# Patient Record
Sex: FEMALE | Race: WHITE | Employment: OTHER | URBAN - METROPOLITAN AREA
[De-identification: names, ages, dates, MRNs, and addresses within clinical notes are randomized per-mention and may not be internally consistent; named-entity substitution may affect disease eponyms.]

---

## 2017-03-28 ENCOUNTER — TELEPHONE (OUTPATIENT)
Dept: FAMILY MEDICINE CLINIC | Age: 57
End: 2017-03-28

## 2017-03-28 ENCOUNTER — OFFICE VISIT (OUTPATIENT)
Dept: FAMILY MEDICINE CLINIC | Age: 57
End: 2017-03-28

## 2017-03-28 VITALS
HEART RATE: 66 BPM | OXYGEN SATURATION: 96 % | WEIGHT: 172.8 LBS | SYSTOLIC BLOOD PRESSURE: 117 MMHG | DIASTOLIC BLOOD PRESSURE: 63 MMHG | BODY MASS INDEX: 29.5 KG/M2 | TEMPERATURE: 98 F | HEIGHT: 64 IN | RESPIRATION RATE: 18 BRPM

## 2017-03-28 DIAGNOSIS — H69.82 EUSTACHIAN TUBE DYSFUNCTION, LEFT: Primary | ICD-10-CM

## 2017-03-28 NOTE — PATIENT INSTRUCTIONS
Purchase over the counter Sudafed 12hr, off brand is fine, take 1 pill in the morning and Flonase Nasal Spray, use 2 sprays each side of your nose once daily. Purchase Afrin or Neosynephrine Nasal Spray to take with you on your flight to use if needed (pain, pressure or fullness of your ears). On the day of your flights (both to and from) take your Sudafed 1 hour prior to [address] Use over the counter Tylenol or Advil as needed for pain.

## 2017-03-28 NOTE — TELEPHONE ENCOUNTER
Pt has an earache, would like to be seen tomorrow if possible. She is going [place] seas and doesn't want to have the pain the whole time.      Pt# [phone] ext 4. her work #

## 2017-03-28 NOTE — PROGRESS NOTES
HISTORY OF PRESENT ILLNESS  Alane Collet is a 59 y.o. female here today with pain/fullness of left ear since yesterday. She will be flying to [facility] in about 2 weeks and she is worried about flying with ear pain. No sore throat, fever or lower respiratory sxs. Ear Pain   The history is provided by the patient. This is a new problem. The current episode started yesterday. Pertinent negatives include no chest pain, no abdominal pain and no shortness of breath. Review of Systems   Constitutional: Negative for fever. HENT: Negative for congestion and sore throat. Respiratory: Negative for shortness of breath. Cardiovascular: Negative for chest pain. Gastrointestinal: Negative for abdominal pain. Neurological: Negative for dizziness. Physical Exam   Constitutional: She is oriented to person, place, and time. She appears well-developed and well-nourished. /63 (BP 1 Location: Left arm, BP Patient Position: Sitting)  Pulse 66  Temp 98 °F (36.7 °C) (Oral)   Resp 18  Ht 5' 4\" (1.626 m)  Wt 172 lb 12.8 oz (78.4 kg)  SpO2 96%  BMI 29.66 kg/m2     HENT:   TMs dull but clear bilaterally, no pre or post auricular adenopathy   Cardiovascular: Normal heart sounds. Pulmonary/Chest: Breath sounds normal.   Lymphadenopathy:     She has no cervical adenopathy. Neurological: She is alert and oriented to person, place, and time. Nursing note and vitals reviewed.     Patient Active Problem List   Diagnosis Code    Depression F32.9    History of lumpectomy of right breast Z90.11    History of colonoscopy Z98.890     Past Medical History:   Diagnosis Date    Depression     Psychotic disorder     Thyroid disease      Social History     Social History    Marital status: SINGLE     Spouse name: N/A    Number of children: N/A    Years of education: N/A     Social History Main Topics    Smoking status: Never Smoker    Smokeless tobacco: Never Used    Alcohol use No    Drug use: No    Sexual activity: Not Asked     Other Topics Concern    None     Social History Narrative     Family History   Problem Relation Age of Onset    Cancer Mother     Alzheimer Mother     Parkinson's Disease Mother     Stroke Mother     Breast Cancer Mother 54    Cancer Father     Breast Cancer Cousin 35      age 35     Current Outpatient Prescriptions   Medication Sig    buPROPion SR (WELLBUTRIN SR) 150 mg SR tablet Take 150 mg by mouth two (2) times a day.  perphenazine (TRILAFON) 2 mg tablet Take 4 mg by mouth two (2) times a day.  multivitamin (ONE A DAY) tablet Take 1 Tab by mouth daily. No Known Allergies      ASSESSMENT and PLAN  Treatment instructions printed out for pt to use Sudafed and Flonase prior to flight and take Afrin with her, Tylenol and/or Advil as needed for pain, follow up if not improving. Care plan reviewed and pt understands. After visit summary printed and reviewed with patient. Woodrow Moore was seen today for ear pain.     Diagnoses and all orders for this visit:    Eustachian tube dysfunction, left

## 2017-03-28 NOTE — MR AVS SNAPSHOT
Visit Information Date & Time Provider Department Dept. Phone Encounter #  
 3/28/2017  3:45 PM Catherine Restrepo MD [address] [facility] [phone] [id number] Upcoming Health Maintenance Date Due  
 PAP AKA CERVICAL CYTOLOGY 2/15/1979 FOBT Q 1 YEAR AGE 50-75 2/15/2008 BREAST CANCER SCRN MAMMOGRAM 12/16/2018 DTaP/Tdap/Td series (2 - Td) 12/20/2026 Allergies as of 3/28/2017  Review Complete On: 3/28/2017 By: Carla Benz LPN No Known Allergies Current Immunizations  Reviewed on 12/20/2016 Name Date Influenza Vaccine (Quad) PF 12/2/2016 Tdap 12/20/2016 Not reviewed this visit You Were Diagnosed With   
  
 Codes Comments Eustachian tube dysfunction, left    -  Primary ICD-10-CM: O90.78 ICD-9-CM: 381.81 Vitals BP Pulse Temp Resp Height(growth percentile) Weight(growth percentile)  
 117/63 (BP 1 Location: Left arm, BP Patient Position: Sitting) 66 98 °F (36.7 °C) (Oral) 18 5' 4\" (1.626 m) 172 lb 12.8 oz (78.4 kg) SpO2 BMI OB Status Smoking Status 96% 29.66 kg/m2 Menopause Never Smoker BMI and BSA Data Body Mass Index Body Surface Area  
 29.66 kg/m 2 1.88 m 2 Preferred Pharmacy Pharmacy Name Phone [id number]- [facility] [address] [phone] Your Updated Medication List  
  
   
This list is accurate as of: 3/28/17  4:24 PM.  Always use your most recent med list.  
  
  
  
  
 buPROPion  mg SR tablet Commonly known as:  Moe Rad Take 150 mg by mouth two (2) times a day. multivitamin tablet Commonly known as:  ONE A DAY Take 1 Tab by mouth daily. perphenazine 2 mg tablet Commonly known as:  TRILAFON Take 4 mg by mouth two (2) times a day. Patient Instructions Purchase over the counter Sudafed 12hr, off brand is fine, take 1 pill in the morning and Flonase Nasal Spray, use 2 sprays each side of your nose once daily. Purchase Afrin or Neosynephrine Nasal Spray to take with you on your flight to use if needed (pain, pressure or fullness of your ears). On the day of your flights (both to and from) take your Sudafed 1 hour prior to [address]. Use over the counter Tylenol or Advil as needed for pain. Introducing [facility]! Uriah Redmond introduces [employer] patient portal. Now you can access parts of your medical record, email your doctor's office, and request medication refills online. 1. In your internet browser, go to [email or web address] 2. Click on the First Time User? Click Here link in the Sign In box. You will see the New Member Sign Up page. 3. Enter your [employer] Access Code exactly as it appears below. You will not need to use this code after youve completed the sign-up process. If you do not sign up before the expiration date, you must request a new code. · [employer] Access Code: [email or web address] Expires: 6/26/2017  4:24 PM 
 
4. Enter the last four digits of your Social Security Number (xxxx) and Date of Birth (mm/dd/yyyy) as indicated and click Submit. You will be taken to the next sign-up page. 5. Create a [employer] ID. This will be your [employer] login ID and cannot be changed, so think of one that is secure and easy to remember. 6. Create a [employer] password. You can change your password at any time. 7. Enter your Password Reset Question and Answer. This can be used at a later time if you forget your password. 8. Enter your e-mail address. You will receive e-mail notification when new information is available in [address] 9. Click Sign Up. You can now view and download portions of your medical record. 10. Click the Download Summary menu link to download a portable copy of your medical information.  
 
If you have questions, please visit the Frequently Asked Questions section of the [employer]. Remember, "[employer]"hart is NOT to be used for urgent needs. For medical emergencies, dial 911. Now available from your iPhone and Android! Please provide this summary of care documentation to your next provider. Your primary care clinician is listed as Avery Kaur. If you have any questions after today's visit, please call [phone].

## 2017-03-28 NOTE — PROGRESS NOTES
Chief Complaint   Patient presents with    Ear Pain     Left ear hurting. Wants both ears checked for wax.

## 2018-02-28 ENCOUNTER — HOSPITAL ENCOUNTER (OUTPATIENT)
Dept: MAMMOGRAPHY | Age: 58
Discharge: HOME | End: 2018-02-28
Payer: COMMERCIAL

## 2018-02-28 DIAGNOSIS — R92.8 ABNORMAL MAMMOGRAM OF RIGHT BREAST: ICD-10-CM

## 2018-02-28 DIAGNOSIS — Z12.39 SCREENING BREAST EXAMINATION: ICD-10-CM

## 2018-02-28 PROCEDURE — 76642 ULTRASOUND BREAST LIMITED: CPT

## 2018-02-28 PROCEDURE — 77065 DX MAMMO INCL CAD UNI: CPT

## 2018-02-28 PROCEDURE — 77067 SCR MAMMO BI INCL CAD: CPT

## 2019-03-05 ENCOUNTER — HOSPITAL ENCOUNTER (OUTPATIENT)
Dept: MAMMOGRAPHY | Age: 59
Discharge: HOME | End: 2019-03-05
Payer: COMMERCIAL

## 2019-03-05 DIAGNOSIS — Z12.31 VISIT FOR SCREENING MAMMOGRAM: ICD-10-CM

## 2019-03-05 PROCEDURE — 77067 SCR MAMMO BI INCL CAD: CPT

## 2020-01-20 ENCOUNTER — HOSPITAL ENCOUNTER (OUTPATIENT)
Dept: GENERAL RADIOLOGY | Age: 60
Discharge: HOME | End: 2020-01-20
Payer: COMMERCIAL

## 2020-01-20 DIAGNOSIS — K59.04 CHRONIC IDIOPATHIC CONSTIPATION: ICD-10-CM

## 2020-01-20 DIAGNOSIS — R32 INCONTINENCE OF URINE: ICD-10-CM

## 2020-01-20 DIAGNOSIS — K21.9 GASTROESOPHAGEAL REFLUX DISEASE: ICD-10-CM

## 2020-01-20 DIAGNOSIS — R15.9 INCONTINENCE, FECES: ICD-10-CM

## 2020-01-20 PROCEDURE — 74018 RADEX ABDOMEN 1 VIEW: CPT

## 2020-01-30 ENCOUNTER — HOSPITAL ENCOUNTER (OUTPATIENT)
Age: 60
Setting detail: OUTPATIENT SURGERY
Discharge: HOME | End: 2020-01-30
Attending: SPECIALIST | Admitting: SPECIALIST
Payer: COMMERCIAL

## 2020-01-30 VITALS
DIASTOLIC BLOOD PRESSURE: 67 MMHG | SYSTOLIC BLOOD PRESSURE: 126 MMHG | HEART RATE: 76 BPM | RESPIRATION RATE: 16 BRPM | OXYGEN SATURATION: 100 %

## 2020-01-30 PROCEDURE — 77030040810 HC CATH BLLN ANORECT EXPULSN MUIS -B: Performed by: SPECIALIST

## 2020-01-30 PROCEDURE — 76040000007: Performed by: SPECIALIST

## 2020-01-30 RX ORDER — FAMOTIDINE 20 MG/1
20 TABLET, FILM COATED ORAL 2 TIMES DAILY
COMMUNITY

## 2020-01-30 NOTE — DISCHARGE INSTRUCTIONS
Tai Longoria  [id number]  1960      MANOMETRY DISCHARGE INSTRUCTION    You may resume your regular diet as tolerated. You may resume your normal daily activities. Call your Physician if you have any complications or questions. [employer] Activation    Thank you for requesting access to [employer]. Please follow the instructions below to securely access and download your online medical record. [employer] allows you to send messages to your doctor, view your test results, renew your prescriptions, schedule appointments, and more. How Do I Sign Up? 1. In your internet browser, go to [email or web address].[employer] [place]  2. Click on the First Time User? Click Here link in the Sign In box. You will be redirect to the New Member Sign Up page. 3. Enter your [employer] Access Code exactly as it appears below. You will not need to use this code after youve completed the sign-up process. If you do not sign up before the expiration date, you must request a new code. [employer] Access Code: [email or web address]  Expires: 3/5/2020 12:22 PM (This is the date your [employer] access code will )    4. Enter the last four digits of your Social Security Number (xxxx) and Date of Birth (mm/dd/yyyy) as indicated and click Submit. You will be taken to the next sign-up page. 5. Create a [employer] ID. This will be your [employer] login ID and cannot be changed, so think of one that is secure and easy to remember. 6. Create a [employer] password. You can change your password at any time. 7. Enter your Password Reset Question and Answer. This can be used at a later time if you forget your password. 8. Enter your e-mail address. You will receive e-mail notification when new information is available in [address] 9. Click Sign Up. You can now view and download portions of your medical record. 10. Click the Download Summary menu link to download a portable copy of your medical information.     Additional Information    If you have questions, please visit the Frequently Asked Questions section of the [employer] website at [email or web address] Remember, [employer] is NOT to be used for urgent needs. For medical emergencies, dial 911.

## 2020-02-28 NOTE — OP NOTES
[facility]  OPERATIVE REPORT    Name:  Guy Gillette  MR#:  [id number]  :  1960  ACCOUNT #:  [de-identified]  DATE OF SERVICE:  2020      PREPROCEDURE DIAGNOSIS:  Fecal smearing. POSTPROCEDURE DIAGNOSES:  1. Normal resting tone. 2.  Adequate increased voluntary squeeze; however, does not sustain squeeze proper duration. 3.  Pelvic floor dyskinesia. 4.  Abnormal sensory findings, see below. 5.  Balloon expulsion is passed. PROCEDURE PLANNED:  1. Anorectal manometry. 2.  Assessment of anorectal function with balloon. PROCEDURE PERFORMED:  1. Anorectal manometry. 2.  Assessment of anorectal function with balloon. SURGEON:  Benja Webb MD    ASSISTANT:  Sony Morales    ANESTHESIA:  None. COMPLICATIONS:  None. SPECIMENS REMOVED:  None. IMPLANTS:  None. ESTIMATED BLOOD LOSS:  None. DESCRIPTION OF PROCEDURE:  High-resolution anorectal manometry was performed by the nursing staff with subsequent interpretation by Dr. Edwin Stephenson. Sphincter pressures are measured:  Rectal and abdominal reference mean and max. Normals are greater than 30 mmHg at rest.  Maximum rectal reference 50.7. Mean rectal reference 41.2. Maximum abdominal reference 61.4, mean abdominal reference 51.9. The patient was then asked to voluntarily squeeze. Normals will increase squeeze by more than 30 mmHg and sustain for more than 10 seconds. She augments squeeze appropriately to rectal reference 151.6 and abdominal reference 168.0. However, she only sustained squeeze for 8.9 seconds. She was then asked to push or bear down. Residual anal pressure paradoxically increases to 89.7 mmHg. This is a +18% relaxation. Intrarectal pressure at this time is 69.4 for rectoanal pressure differential of -20.7. The balloon was utilized. Balloon expulsion is passed. Rectoanal inhibitory reflex is present. First sensation to rectal filling is at 80 mL.   This is four times normal of 20 mL. The urge to defecate is at 120 mL. This is slightly elevated. Normals are  mL. Maximum discomfort is at 170 mL. Normals are about 180 mL. COMMENT:  There are number of findings. I suspect the primary process is a sphincter that cannot sustain squeeze. The sensory findings suggest neurologic impairment of sensation versus stool in the vault. I recommend the followin. MRI defecography. 2.  Consider transanal ultrasound to look for focal defect. 3.  Treatment would be pelvic floor therapy and then consideration of Valium suppository. The Valium suppository would work on the pelvic floor dyskinesia portion, but I would be concerned about leakage as a side effect.           Praveen Baker MD      [id number]  D:  2020 7:19  T:  2020 7:53  JOB #:  [id number]  CC:  MD Fuentes Fisher NP Rhett Dollar, MD

## 2022-11-16 ENCOUNTER — TRANSCRIBE ORDER (OUTPATIENT)
Dept: SCHEDULING | Age: 62
End: 2022-11-16

## 2022-11-16 DIAGNOSIS — Z86.010 PERSONAL HISTORY OF COLONIC POLYPS: ICD-10-CM

## 2022-11-16 DIAGNOSIS — K59.04 CHRONIC IDIOPATHIC CONSTIPATION: Primary | ICD-10-CM

## 2022-11-16 DIAGNOSIS — M62.89 PELVIC FLOOR DYSFUNCTION: ICD-10-CM

## 2023-01-05 ENCOUNTER — HOSPITAL ENCOUNTER (OUTPATIENT)
Dept: MRI IMAGING | Age: 63
End: 2023-01-05
Attending: INTERNAL MEDICINE
Payer: COMMERCIAL

## 2023-01-05 DIAGNOSIS — Z86.010 PERSONAL HISTORY OF COLONIC POLYPS: ICD-10-CM

## 2023-01-05 DIAGNOSIS — M62.89 PELVIC FLOOR DYSFUNCTION: ICD-10-CM

## 2023-01-05 DIAGNOSIS — K59.04 CHRONIC IDIOPATHIC CONSTIPATION: ICD-10-CM

## 2023-01-05 PROCEDURE — 72195 MRI PELVIS W/O DYE: CPT

## 2024-06-05 ENCOUNTER — HOSPITAL ENCOUNTER (OUTPATIENT)
Facility: HOSPITAL | Age: 64
Discharge: HOME | End: 2024-06-08
Payer: MEDICARE

## 2024-06-05 DIAGNOSIS — R19.00 INTRA-ABDOMINAL AND PELVIC SWELLING, MASS AND LUMP, UNSPECIFIED SITE: ICD-10-CM

## 2024-06-05 LAB — CREAT BLD-MCNC: 0.8 MG/DL (ref 0.6–1.3)

## 2024-06-05 PROCEDURE — 72193 CT PELVIS W/DYE: CPT

## 2024-06-05 PROCEDURE — 6360000004 HC RX CONTRAST MEDICATION: Performed by: RADIOLOGY

## 2024-06-05 PROCEDURE — 82565 ASSAY OF CREATININE: CPT

## 2024-06-05 RX ADMIN — IOPAMIDOL 100 ML: 755 INJECTION, SOLUTION INTRAVENOUS at 10:03

## 2025-06-11 ENCOUNTER — TRANSCRIBE ORDERS (OUTPATIENT)
Facility: HOSPITAL | Age: 65
End: 2025-06-11

## 2025-06-11 DIAGNOSIS — R13.12 OROPHARYNGEAL DYSPHAGIA: Primary | ICD-10-CM

## 2025-06-13 ENCOUNTER — TRANSCRIBE ORDERS (OUTPATIENT)
Facility: HOSPITAL | Age: 65
End: 2025-06-13

## 2025-06-13 DIAGNOSIS — R05.9 COUGH, UNSPECIFIED TYPE: Primary | ICD-10-CM

## 2025-06-13 DIAGNOSIS — R13.19 ESOPHAGEAL DYSPHAGIA: ICD-10-CM

## 2025-06-13 DIAGNOSIS — R13.10 DYSPHAGIA, UNSPECIFIED TYPE: ICD-10-CM

## 2025-07-07 ENCOUNTER — HOSPITAL ENCOUNTER (OUTPATIENT)
Facility: HOSPITAL | Age: 65
Discharge: HOME | End: 2025-07-10
Payer: MEDICARE

## 2025-07-07 DIAGNOSIS — R05.9 COUGH, UNSPECIFIED TYPE: ICD-10-CM

## 2025-07-07 DIAGNOSIS — R13.10 DYSPHAGIA, UNSPECIFIED TYPE: ICD-10-CM

## 2025-07-07 DIAGNOSIS — R13.19 ESOPHAGEAL DYSPHAGIA: ICD-10-CM

## 2025-07-07 PROCEDURE — 92611 MOTION FLUOROSCOPY/SWALLOW: CPT

## 2025-07-07 PROCEDURE — 74230 X-RAY XM SWLNG FUNCJ C+: CPT

## 2025-07-07 NOTE — PROGRESS NOTES
[facility]  SPEECH PATHOLOGY MODIFIED BARIUM SWALLOW STUDY  Patient: Kathy Carrasco (65 y.o. female)  Date: 7/7/2025  Referring Provider:  HI Calderon    SUBJECTIVE:   Patient reported she has dry mouth, is unable to swallow her saliva while lying down, and chokes with drinking ~1x/week. She reported it has not happened in a while. Patient denied difficulty with food or pills. Patient endorsed reflux on famotidine.     OBJECTIVE:   Past Medical History:   Past Medical History:   Diagnosis Date    Depression     Psychotic disorder (HCC)     Thyroid disease      Past Surgical History:   Procedure Laterality Date    BREAST BIOPSY Right     1993 mammotome & surgical:  benign    BREAST SURGERY      COLONOSCOPY      HEENT       Current Dietary Status:  regular/thin  Type of Study: Modified barium swallow  Film Views: Lateral;Fluoro  Radiologist: Dr. Solis  Patient Position: standing upright      Trial 1:   Consistencies Administered: Regular;Pureed;Thin;Thin - teaspoon;Thin - cup;Thin - straw   How Presented: Self-fed/presented;Cup/sip;Spoon;Straw;Successive Swallows                             Penetration: None   Aspiration/Timing: No evidence of aspiration     Swallowing Physiology  Penetration-Aspiration Scale (PAS): 1 - Material does not enter the airway       The Modified Barium Swallow Impairment Profile: [id number]  ORAL Impairment  Component 1--Lip Closure: 1=Interlabial escape; no progression to anterior lip  Component 2--Tongue Control During Bolus Hold: 0=Cohesive bolus between tongue to palatal seal  Component 3--Bolus Preparation/Mastication: 0=Timely and efficient chewing and mashing  Component 4--Bolus Transport/Lingual Motion: 0=Brisk tongue motion  Component 5--Oral Residue: 2=Residue collection on oral structures   - Cleared with spontaneous additional swallow  Component 6--Initiation of Pharyngeal Swallow: 1=Bolus head in valleculae    PHARYNGEAL

## (undated) DEVICE — STOPCOCK IV 4 W TRNSPAR

## (undated) DEVICE — CATHETER BALLOON DIL 26X48 MMX60 CM ANORECT BAROSTAT

## (undated) DEVICE — Device